# Patient Record
Sex: MALE | Race: WHITE | NOT HISPANIC OR LATINO | Employment: OTHER | URBAN - METROPOLITAN AREA
[De-identification: names, ages, dates, MRNs, and addresses within clinical notes are randomized per-mention and may not be internally consistent; named-entity substitution may affect disease eponyms.]

---

## 2018-10-09 ENCOUNTER — HOSPITAL ENCOUNTER (EMERGENCY)
Facility: HOSPITAL | Age: 48
Discharge: HOME | End: 2018-10-09
Attending: EMERGENCY MEDICINE
Payer: COMMERCIAL

## 2018-10-09 VITALS
BODY MASS INDEX: 24.69 KG/M2 | TEMPERATURE: 99 F | RESPIRATION RATE: 18 BRPM | WEIGHT: 153 LBS | DIASTOLIC BLOOD PRESSURE: 100 MMHG | HEART RATE: 72 BPM | OXYGEN SATURATION: 99 % | SYSTOLIC BLOOD PRESSURE: 181 MMHG

## 2018-10-09 DIAGNOSIS — L08.9 SOFT TISSUE INFECTION: Primary | ICD-10-CM

## 2018-10-09 DIAGNOSIS — Z51.89 VISIT FOR WOUND CHECK: ICD-10-CM

## 2018-10-09 PROCEDURE — 25000003 PHARM REV CODE 250: Performed by: PHYSICIAN ASSISTANT

## 2018-10-09 PROCEDURE — 99283 EMERGENCY DEPT VISIT LOW MDM: CPT

## 2018-10-09 PROCEDURE — 99283 EMERGENCY DEPT VISIT LOW MDM: CPT | Mod: ,,, | Performed by: PHYSICIAN ASSISTANT

## 2018-10-09 RX ORDER — DOXYCYCLINE 100 MG/1
100 CAPSULE ORAL 2 TIMES DAILY
Qty: 19 CAPSULE | Refills: 0 | Status: SHIPPED | OUTPATIENT
Start: 2018-10-09 | End: 2018-10-19

## 2018-10-09 RX ORDER — SULFAMETHOXAZOLE AND TRIMETHOPRIM 800; 160 MG/1; MG/1
1 TABLET ORAL
COMMUNITY
End: 2020-10-14

## 2018-10-09 RX ORDER — DOXYCYCLINE HYCLATE 100 MG
100 TABLET ORAL
Status: COMPLETED | OUTPATIENT
Start: 2018-10-09 | End: 2018-10-09

## 2018-10-09 RX ADMIN — DOXYCYCLINE HYCLATE 100 MG: 100 TABLET, COATED ORAL at 04:10

## 2018-10-09 NOTE — ED NOTES
Discharge home, states understanding to follow up as directed. Ambulates out of ED without difficulty. RX given, Med prior to discharge,

## 2018-10-09 NOTE — ED NOTES
Patient identifiers verified and correct for Mr Sales  C/C: redness to outer LLE  APPEARANCE: awake and alert in NAD.  SKIN: warm, dry Area to LLE with small amount redness, no black area, small puncture to outer left leg, warmth noted to circled area  MUSCULOSKELETAL: Patient moving all extremities spontaneously, no obvious swelling or deformities noted. Ambulates independently.  RESPIRATORY: Denies shortness of breath.Respirations unlabored. Denies fever.   CARDIAC: Denies CP, 2+ distal pulses; no peripheral edema  ABDOMEN: S/ND/NT, Denies nausea  : voids spontaneously, denies difficulty  Neurologic: AAO x 4; follows commands equal strength in all extremities; denies numbness/tingling. Denies dizziness Denies weakness

## 2018-10-09 NOTE — DISCHARGE INSTRUCTIONS
Follow-up with your family doctor in 2-3 days for close wound check.  Start taking the doxycycline.  Stop using the Bactrim.  Return to the ER with concerning symptoms. Tylenol or Motrin for pain if needed.

## 2018-10-09 NOTE — ED TRIAGE NOTES
Patient states he was in br[place], pulled up crab trap, puncture wounds to outer LLE. Noted redness to LLE, dots placed per spouse yesterday, went to Occupational  clinic, cleansed with Betadine, rx antibiotic Bactrim with 3 doses.

## 2018-10-09 NOTE — ED PROVIDER NOTES
Encounter Date: 10/9/2018       History     Chief Complaint   Patient presents with    Wound Infection     left leg was punctured by crab trap yesterday; left leg now swollen and painful     Patient is a 48-year-old male with history of CAD, atrial fibrillation is presenting to the ER for evaluation of wound infection.  Patient states that 2 days ago he was scratched by a crab trap.  He states that he was seen at the Occupational office and he was updated on his tetanus.  He was also placed on Bactrim.  He is concerned that the infection is worsening.  He denies any fevers or chills at home.  Patient denies exquisite pain but states that is slightly uncomfortable.  He has not taken any pain medication.  Denies paresthesia or focal weakness to his extremities.      The history is provided by the patient.     Review of patient's allergies indicates:  No Known Allergies  Past Medical History:   Diagnosis Date    Atrial fibrillation     Coronary artery disease     Fracture, clavicle 2003    HSV-1 infection     Palpitations      Past Surgical History:   Procedure Laterality Date    TONSILLECTOMY, ADENOIDECTOMY      26yo     Family History   Problem Relation Age of Onset    Alzheimer's disease Mother     Coronary artery disease Father         late 50's; very lifestyle oriented     Social History     Tobacco Use    Smoking status: Never Smoker    Smokeless tobacco: Never Used   Substance Use Topics    Alcohol use: Yes     Alcohol/week: 0.0 oz     Comment: once weekly maybe    Drug use: No     Review of Systems   Constitutional: Negative for chills and fever.   HENT: Negative for congestion.    Respiratory: Negative for cough and shortness of breath.    Cardiovascular: Negative for chest pain and palpitations.   Gastrointestinal: Negative for nausea and vomiting.   Musculoskeletal: Negative for joint swelling.   Skin: Positive for wound.   Allergic/Immunologic: Negative for immunocompromised state.    Neurological: Negative for dizziness and weakness.   Hematological: Does not bruise/bleed easily.       Physical Exam     Initial Vitals [10/09/18 1544]   BP Pulse Resp Temp SpO2   (!) 181/100 72 18 98.5 °F (36.9 °C) 99 %      MAP       --         Physical Exam    Constitutional: He appears well-developed and well-nourished. He is not diaphoretic. No distress.   HENT:   Head: Normocephalic and atraumatic.   Eyes: Conjunctivae and EOM are normal.   Cardiovascular: Normal rate, regular rhythm, normal heart sounds and intact distal pulses.   Pulmonary/Chest: Breath sounds normal.   Neurological: He is alert and oriented to person, place, and time.   Skin: Skin is warm and dry.   1 cm abrasion to left lateral lower leg. There is minimal surrounding erythema. Not exquisitely tender.  No crepitus noted. No lymphangitis         ED Course   Procedures  Labs Reviewed - No data to display       Imaging Results    None                APC / Resident Notes:   Patient seen in the ER promptly upon arrival.  He is afebrile, no acute distress.  Physical examination reveals a 1 cm abrasion to the left lower extremity.  There is surrounding erythema and minimal tenderness on palpation. No lymphangitis noted. No crepitus noted. No evidence of necrosis. No concern for necrotizing fasciitis      Patient was given a dose of doxycycline ED.  Will switch antibiotics from Bactrim to doxycycline as there is concern for vibrio.  Felt that this is a mild infection at this time.  He was advised to have close follow up with family doctor within 2 days.  He was advised to take Tylenol or Motrin for pain if needed.  He was given strict return  precautions if his symptoms worsen.  He agreed to this treatment plan.  He is stable for discharge at this time.    The care of this patient was overseen by attending physician who agrees with treatment, plan, and disposition.           Attending Attestation:     Physician Attestation Statement for NP/PA:    I discussed this assessment and plan of this patient with the NP/PA, but I did not personally examine the patient. The face to face encounter was performed by the NP/PA.                     Clinical Impression:   The primary encounter diagnosis was Soft tissue infection. A diagnosis of Visit for wound check was also pertinent to this visit.      Disposition:   Disposition: Discharged  Condition: Stable                        Cindy Gordon PA-C  10/09/18 1639       Cindy Gordon PA-C  10/09/18 1739       James Murray MD  10/13/18 0911

## 2020-10-14 ENCOUNTER — OFFICE VISIT (OUTPATIENT)
Dept: FAMILY MEDICINE | Facility: CLINIC | Age: 50
End: 2020-10-14
Payer: COMMERCIAL

## 2020-10-14 VITALS
WEIGHT: 158.75 LBS | HEART RATE: 71 BPM | OXYGEN SATURATION: 98 % | BODY MASS INDEX: 25.51 KG/M2 | TEMPERATURE: 97 F | DIASTOLIC BLOOD PRESSURE: 82 MMHG | SYSTOLIC BLOOD PRESSURE: 121 MMHG | HEIGHT: 66 IN

## 2020-10-14 DIAGNOSIS — H65.111 NON-RECURRENT ACUTE ALLERGIC OTITIS MEDIA OF RIGHT EAR: ICD-10-CM

## 2020-10-14 DIAGNOSIS — Z12.5 SCREENING FOR MALIGNANT NEOPLASM OF PROSTATE: ICD-10-CM

## 2020-10-14 DIAGNOSIS — Z12.12 SCREENING FOR COLORECTAL CANCER: ICD-10-CM

## 2020-10-14 DIAGNOSIS — I10 ESSENTIAL HYPERTENSION: Primary | ICD-10-CM

## 2020-10-14 DIAGNOSIS — Z12.11 SCREENING FOR COLORECTAL CANCER: ICD-10-CM

## 2020-10-14 PROCEDURE — 99204 PR OFFICE/OUTPT VISIT, NEW, LEVL IV, 45-59 MIN: ICD-10-PCS | Mod: S$GLB,,, | Performed by: INTERNAL MEDICINE

## 2020-10-14 PROCEDURE — 99999 PR PBB SHADOW E&M-EST. PATIENT-LVL III: CPT | Mod: PBBFAC,,, | Performed by: INTERNAL MEDICINE

## 2020-10-14 PROCEDURE — 99204 OFFICE O/P NEW MOD 45 MIN: CPT | Mod: S$GLB,,, | Performed by: INTERNAL MEDICINE

## 2020-10-14 PROCEDURE — 99999 PR PBB SHADOW E&M-EST. PATIENT-LVL III: ICD-10-PCS | Mod: PBBFAC,,, | Performed by: INTERNAL MEDICINE

## 2020-10-14 RX ORDER — AMOXICILLIN AND CLAVULANATE POTASSIUM 875; 125 MG/1; MG/1
1 TABLET, FILM COATED ORAL EVERY 12 HOURS
Qty: 14 TABLET | Refills: 0 | Status: SHIPPED | OUTPATIENT
Start: 2020-10-14 | End: 2020-10-14

## 2020-10-14 RX ORDER — AMOXICILLIN AND CLAVULANATE POTASSIUM 875; 125 MG/1; MG/1
1 TABLET, FILM COATED ORAL EVERY 12 HOURS
Qty: 20 TABLET | Refills: 0 | Status: SHIPPED | OUTPATIENT
Start: 2020-10-14 | End: 2020-10-24

## 2020-10-14 RX ORDER — METHYLPREDNISOLONE 4 MG/1
TABLET ORAL
Qty: 1 PACKAGE | Refills: 0 | Status: SHIPPED | OUTPATIENT
Start: 2020-10-14 | End: 2020-11-04

## 2020-10-14 NOTE — ASSESSMENT & PLAN NOTE
augmnetin x7d with OTC antihistamine (Zyrtec, Claritin, Allegra, or Xyxal) and a steroid nasal spray such as flonase

## 2020-10-14 NOTE — PROGRESS NOTES
[facility] Note    Chief Complaint      Chief Complaint   Patient presents with    Otalgia     pt is having right ear pain, states that he just feels drained due to it.     History of Present Illness      Tristan Sales is a 50 y.o. male who presents today for chief complaint ear pain x2 weeks . Patient previously seen by me.    PCP: Delia Monet MD  Patient comes to appointment alone.     HPI   [place], has h/o seasonal allergies, started having allergy symptoms, using nsaids prn, feeling tired and run down,  Sore throat on r side and r ear pressure, mild headache, no cough sneeze congestion, no fever or runny nose, does not want flu shot   Active Problem List with Overview Notes    Diagnosis Date Noted    Acute allergic otitis media of right ear 10/14/2020    Atrial fibrillation 03/04/2015    HSV-1 infection     URI (upper respiratory infection) 05/16/2013     Health Maintenance   Topic Date Due    Hepatitis C Screening  1970    TETANUS VACCINE  03/24/1988    Lipid Panel  05/16/2018       Past Medical History:   Diagnosis Date    Atrial fibrillation     Coronary artery disease     Fracture, clavicle 2003    HSV-1 infection     Palpitations        Past Surgical History:   Procedure Laterality Date    TONSILLECTOMY, ADENOIDECTOMY      28yo       family history includes Alzheimer's disease in his mother; Coronary artery disease in his father.     Social History     Tobacco Use    Smoking status: Never Smoker    Smokeless tobacco: Never Used   Substance Use Topics    Alcohol use: Yes     Alcohol/week: 0.0 standard drinks     Frequency: Never     Drinks per session: Patient refused     Binge frequency: Never     Comment: once weekly maybe    Drug use: No       Review of Systems   Constitutional: Negative for chills, fever, malaise/fatigue and weight loss.   HENT: Positive for ear pain and sore throat. Negative for congestion,  "hearing loss and sinus pain.    Respiratory: Negative for cough, sputum production, shortness of breath and wheezing.    Gastrointestinal: Negative for abdominal pain, diarrhea, nausea and vomiting.        Outpatient Encounter Medications as of 10/14/2020   Medication Sig Note Dispense Refill    multivitamin with minerals tablet Take 1 tablet by mouth once daily.       omega-3 fatty acids 1,000 mg Cap Take by mouth. 2 Capsule Oral Every day.  some times takes twice daily       valacyclovir (VALTREX) 500 MG tablet take 1 tablet by mouth twice a day (Patient taking differently: take 1 tablet by mouth twice a day prn) 10/14/2020: As needed 60 tablet 2    amoxicillin-clavulanate 875-125mg (AUGMENTIN) 875-125 mg per tablet Take 1 tablet by mouth every 12 (twelve) hours. for 10 days  20 tablet 0    methylPREDNISolone (MEDROL DOSEPACK) 4 mg tablet use as directed  1 Package 0    [DISCONTINUED] amoxicillin-clavulanate 875-125mg (AUGMENTIN) 875-125 mg per tablet Take 1 tablet by mouth every 12 (twelve) hours.  14 tablet 0    [DISCONTINUED] sulfamethoxazole-trimethoprim 800-160mg (BACTRIM DS) 800-160 mg Tab Take 1 tablet by mouth every 12 (twelve) hours.        No facility-administered encounter medications on file as of 10/14/2020.        Review of patient's allergies indicates:  No Known Allergies      Physical Exam      Vital Signs  Temp: 97 °F (36.1 °C)  Temp src: Oral  Pulse: 71  SpO2: 98 %  BP: 121/82  Pain Score:   2  Pain Loc: Ear(right ear)  Height and Weight  Height: 5' 6" (167.6 cm)  Weight: 72 kg (158 lb 11.7 oz)  BSA (Calculated - sq m): 1.83 sq meters  BMI (Calculated): 25.6  Weight in (lb) to have BMI = 25: 154.6]    Physical Exam  Vitals signs reviewed.   Constitutional:       Appearance: Normal appearance.   HENT:      Head: Normocephalic and atraumatic.      Right Ear: Ear canal normal. Tympanic membrane is erythematous and retracted.      Left Ear: Ear canal normal. Tympanic membrane is erythematous " and bulging.      Nose: Nose normal.      Mouth/Throat:      Pharynx: Posterior oropharyngeal erythema present. No oropharyngeal exudate.      Tonsils: No tonsillar exudate.          Assessment/Plan     Tristan Sales is a 50 y.o.male with:    Acute allergic otitis media of right ear  augmnetin x7d with OTC antihistamine (Zyrtec, Claritin, Allegra, or Xyxal) and a steroid nasal spray such as flonase           Orders Placed This Encounter   Procedures    Cologuard Screening (Multitarget Stool DNA)     Standing Status:   Future     Number of Occurrences:   1     Standing Expiration Date:   12/13/2021    CBC auto differential     Standing Status:   Future     Standing Expiration Date:   12/13/2021    Lipid Panel     Standing Status:   Future     Standing Expiration Date:   12/13/2021    Comprehensive Metabolic Panel     Standing Status:   Future     Standing Expiration Date:   12/13/2021    PSA, Screening     Standing Status:   Future     Standing Expiration Date:   12/14/2021       Use of the [facility] Patient Portal discussed and encouraged during today's visit  -Continue current medications and maintain follow up with specialists.  Return to clinic in 3 months.  Future Appointments   Date Time Provider Department Center   11/4/2020  8:30 AM [facility], [facility]   1/14/2021 10:45 AM Del[facility] Monet MD [facility]  10/14/2020 11:23 AM    Primary Care Internal Medicine - [facility]

## 2020-11-04 LAB — GASTROCULT GAST QL: NEGATIVE

## 2020-11-12 ENCOUNTER — PATIENT OUTREACH (OUTPATIENT)
Dept: ADMINISTRATIVE | Facility: HOSPITAL | Age: 50
End: 2020-11-12

## 2020-11-12 ENCOUNTER — TELEPHONE (OUTPATIENT)
Dept: ADMINISTRATIVE | Facility: HOSPITAL | Age: 50
End: 2020-11-12

## 2020-11-25 ENCOUNTER — PATIENT MESSAGE (OUTPATIENT)
Dept: FAMILY MEDICINE | Facility: CLINIC | Age: 50
End: 2020-11-25

## 2021-04-09 ENCOUNTER — PATIENT MESSAGE (OUTPATIENT)
Dept: FAMILY MEDICINE | Facility: CLINIC | Age: 51
End: 2021-04-09

## 2021-04-10 RX ORDER — VALACYCLOVIR HYDROCHLORIDE 500 MG/1
TABLET, FILM COATED ORAL
Qty: 60 TABLET | Refills: 2 | Status: SHIPPED | OUTPATIENT
Start: 2021-04-10 | End: 2022-07-01

## 2021-04-15 ENCOUNTER — PATIENT MESSAGE (OUTPATIENT)
Dept: RESEARCH | Facility: HOSPITAL | Age: 51
End: 2021-04-15

## 2022-01-10 ENCOUNTER — PATIENT MESSAGE (OUTPATIENT)
Dept: ADMINISTRATIVE | Facility: HOSPITAL | Age: 52
End: 2022-01-10
Payer: COMMERCIAL

## 2022-07-08 ENCOUNTER — TELEPHONE (OUTPATIENT)
Dept: INTERNAL MEDICINE | Facility: CLINIC | Age: 52
End: 2022-07-08
Payer: COMMERCIAL

## 2022-07-08 DIAGNOSIS — Z00.00 WELLNESS EXAMINATION: ICD-10-CM

## 2022-07-08 DIAGNOSIS — Z11.59 ENCOUNTER FOR HEPATITIS C SCREENING TEST FOR LOW RISK PATIENT: Primary | ICD-10-CM

## 2022-07-08 NOTE — TELEPHONE ENCOUNTER
Pt reached out to me to schedule annual, please call   Labs prders placed he can do prior to appt  Thank you   Delia Monet MD  7/8/2022 9:28 AM

## 2022-12-05 ENCOUNTER — HOSPITAL ENCOUNTER (EMERGENCY)
Facility: HOSPITAL | Age: 52
Discharge: HOME | End: 2022-12-05
Attending: EMERGENCY MEDICINE
Payer: COMMERCIAL

## 2022-12-05 VITALS
RESPIRATION RATE: 18 BRPM | DIASTOLIC BLOOD PRESSURE: 101 MMHG | SYSTOLIC BLOOD PRESSURE: 138 MMHG | TEMPERATURE: 99 F | WEIGHT: 158.75 LBS | OXYGEN SATURATION: 98 % | HEART RATE: 65 BPM | BODY MASS INDEX: 25.62 KG/M2

## 2022-12-05 DIAGNOSIS — I10 HYPERTENSION: ICD-10-CM

## 2022-12-05 DIAGNOSIS — R03.0 ELEVATED BLOOD PRESSURE READING: Primary | ICD-10-CM

## 2022-12-05 PROCEDURE — 99285 EMERGENCY DEPT VISIT HI MDM: CPT | Mod: ,,, | Performed by: EMERGENCY MEDICINE

## 2022-12-05 PROCEDURE — 93010 ELECTROCARDIOGRAM REPORT: CPT | Mod: ,,, | Performed by: INTERNAL MEDICINE

## 2022-12-05 PROCEDURE — 99283 EMERGENCY DEPT VISIT LOW MDM: CPT

## 2022-12-05 PROCEDURE — 93010 EKG 12-LEAD: ICD-10-PCS | Mod: ,,, | Performed by: INTERNAL MEDICINE

## 2022-12-05 PROCEDURE — 99285 PR EMERGENCY DEPT VISIT,LEVEL V: ICD-10-PCS | Mod: ,,, | Performed by: EMERGENCY MEDICINE

## 2022-12-05 PROCEDURE — 93005 ELECTROCARDIOGRAM TRACING: CPT

## 2022-12-06 NOTE — FIRST PROVIDER EVALUATION
Medical screening examination initiated.  I have conducted a focused provider triage encounter, findings are as follows:    Brief history of present illness:    Headache, dizziness, elevated BP (140/102)    Headache 6/10, no hx of headaches, no n/v, no focal weakness, + episodes of feeling off balance, was seeing spots last night, no visual changes today    Not on antihypertensive medication    Vitals:    12/05/22 1811   BP: (!) 138/101   BP Location: Right arm   Patient Position: Sitting   Pulse: 65   Resp: 18   Temp: 98.7 °F (37.1 °C)   TempSrc: Oral   SpO2: 98%   Weight: 72 kg (158 lb 11.7 oz)       Pertinent physical exam:  comfortable, normal speech, symmetrical speech, normal gait    Brief workup plan:  labs, ekg, CT head    Preliminary workup initiated; this workup will be continued and followed by the physician or advanced practice provider that is assigned to the patient when roomed.

## 2022-12-06 NOTE — ED PROVIDER NOTES
Encounter Date: 12/5/2022    SCRIBE #1 NOTE: I, Melba Duong, am scribing for, and in the presence of,  Mika Brooks DO. I have scribed the following portions of the note - the EKG reading. Other sections scribed: HPI, ROS.     History     Chief Complaint   Patient presents with    Hypertension     Headache and dizziness. All began approximately 36 hours ago. Denies vomiting or nausea. Reports tingling in bilateral hands.      Tristan Sales is a 52 y.o. male with a prior medical history of A-fib presenting with hypertension for the past week. Associated symptoms include tinnitus and headaches that has since resolved. He does report that he recently began drinking coffee again for the first time in years. He stopped drinking coffee a few days ago after he noticed the hypertension. He also reports taking Mucinex about 4-5 days ago for a cold. In addition he reports he has been having difficulty sleeping. He has an appointment later in the week with his PCP.  He denies any actual dizziness at this time, visual complaints, lightheadedness, weakness, gait instability, falls or trauma.  He denies any visual changes or current headache.  He is asymptomatic at this time.  No other aggravating or alleviating factors.  He denies any nausea, vomiting or diarrhea.  He denies any chest pain, abdominal pain, back pain or neck pain.    The history is provided by the patient and medical records. No  was used.   Review of patient's allergies indicates:  No Known Allergies  Past Medical History:   Diagnosis Date    Atrial fibrillation     Coronary artery disease     Fracture, clavicle 2003    HSV-1 infection     Palpitations      Past Surgical History:   Procedure Laterality Date    TONSILLECTOMY, ADENOIDECTOMY      28yo     Family History   Problem Relation Age of Onset    Alzheimer's disease Mother     Coronary artery disease Father         late 50's; very lifestyle oriented     Social History      Tobacco Use    Smoking status: Never    Smokeless tobacco: Never   Substance Use Topics    Alcohol use: Yes     Alcohol/week: 0.0 standard drinks     Comment: once weekly maybe    Drug use: No     Review of Systems   Constitutional:  Negative for diaphoresis, fatigue and fever.   HENT:  Positive for tinnitus. Negative for congestion and sore throat.    Eyes:  Negative for photophobia, pain and visual disturbance.   Respiratory:  Negative for chest tightness, shortness of breath and wheezing.    Cardiovascular:  Negative for chest pain and palpitations.        +hypertension   Gastrointestinal:  Negative for abdominal pain, nausea and vomiting.   Genitourinary:  Negative for dysuria, frequency and hematuria.   Musculoskeletal:  Negative for neck pain and neck stiffness.   Skin:  Negative for color change and wound.   Neurological:  Positive for headaches. Negative for tremors and light-headedness.   Psychiatric/Behavioral:  Negative for confusion.      Physical Exam     Initial Vitals [12/05/22 1811]   BP Pulse Resp Temp SpO2   (!) 138/101 65 18 98.7 °F (37.1 °C) 98 %      MAP       --         Physical Exam    Nursing note and vitals reviewed.    Gen/Constitutional: Interactive. No acute distress  Head: Normocephalic, Atraumatic  Neck: supple, no masses or LAD, no JVD  Eyes: PERRLA, conjunctiva clear  Ears, Nose and Throat: No rhinorrhea or stridor.  Cardiac:  Regular rate, Reg Rhythm, No murmur  Pulmonary: CTA Bilat, no wheezes, rhonchi, rales.  No increased work of breathing.  GI: Abdomen soft, non-tender, non-distended; no rebound or guarding  : No CVA tenderness.  Musculoskeletal: Extremities warm, well perfused, no erythema, no edema  Skin: No rashes, cyanosis or jaundice.  Psych: Normal affect   Detailed Neurologic exam:  Mental Status:  Normal attention and reasoning. There is no evidence of a thought disorder. Affect and mood were unremarkable. Speech was normal and prosody was intact. Verbal expression  and comprehension are intact. Immediate recall, recent and remote memory were intact.  Neck:  Supple. No cervical bruits.  Cranial Nerves:  Visual fields were normal to confrontation and visual acuity was at baseline. Funduscopy was limited by pupillary light response. Pupils were of equal (4mm to 3mm bilaterally) and exhibited a normal direct and consensual response to light. There was no APD. Ocular motility was full and there was no nystagmus evident. Strength of masticatory muscles was normal. Facial sensation was normal. Corneal reflexes were present. No facial weakness. Hearing acuity was normal. Palatal movements and gag reflex were intact. Sternocleidomastoid and trapezii strength were normal. Tongue protruded in the midline and showed no atrophy, tremor or fasciculations.  Motor Examination (bulk, tone, strength):  Motor examination showed normal muscle bulk, tone, and strength throughout. Rapid alternating movements were normal. There were no adventitious movements noted.  Muscle Stretch reflexes (MSR's):  Muscle stretch reflexes were symmetric and normoactive. Plantar responses were flexor bilaterally. No pathologic reflexes were appreciated.  Sensory:  Normal light-touch and position sense.  Cerebellar and coordination:  Coordination examination showed normal rapid alternating movements. Finger-finger and finger-nose testing were unremarkable.  Romberg test:  Romberg was negative.  Gait and Station:  Erect posture was normal. There was no postural instability.   Spine:  Normal range of motion. No tenderness on palpation. SLR negative.      ED Course   Procedures  Labs Reviewed - No data to display    EKG Readings: (Independently Interpreted)   Initial Reading: No STEMI. Rhythm: Sinus Bradycardia. Heart Rate: 58. Axis: Normal.   Normal intervals     Imaging Results    None          Medications - No data to display  Medical Decision Making:   History:   Old Medical Records: I decided to obtain old medical  records.  Initial Assessment:   Tristan Sales is a 52 y.o. male with a prior medical history of A-fib presenting with hypertension for the past week.  Differential Diagnosis:   Elevated blood pressure reading, medication reaction, fatigue, ACS, hypertensive emergency, intracranial process  Independently Interpreted Test(s):   I have ordered and independently interpreted EKG Reading(s) - see prior notes  Clinical Tests:   Medical Tests: Ordered and Reviewed     The patient is afebrile with slightly hypertensive without tachypnea, hypoxemia or tachycardia.  Physical exam findings with no focal neurologic deficits, lung sounds clear, cardiac exam unremarkable.  There is no abdominal peritoneal findings.  Patient is ambulatory without dizziness, lightheadedness or any gait instability.  His symptoms have completely resolved.  He was initially seen by triage and reported having episodes of tinnitus and dizziness.  CT scan, basic labs and ECG were ordered from triage.  I reviewed patient's findings including long and lengthy discussion at bedside.  He does have significant stressors of late but denies any other high-risk symptoms.  I offered him the CT scan, labs and ECG which were ordered by triage.  Patient declined CT scan and lab work at this time given that he is asymptomatic.  I was able to obtain an ECG with no signs of ischemia or STEMI on my read.  He does not have any symptoms of deficits on my exam or any red flag warning signs.  I instructed him to keep a blood pressure journal log and present to his PCP this week for re-evaluation where they may discuss initiation of blood pressure medicine if needed.  I also educated him on low-sodium diet, relaxation, stress release along with strict ED precautions and return instructions. Patient agreeable to discharge plan. Strict ED precautions and return instructions discussed at length and patient verbalized understanding. All questions were answered and ample  time was given for questions.      Complexity:  High-risk     Scribe Attestation:   Scribe #1: I performed the above scribed service and the documentation accurately describes the services I performed. I attest to the accuracy of the note.            I, Dr. Mika Brooks, personally performed the services described in this documentation. All medical record entries made by the scribe were at my direction and in my presence.  I have reviewed the chart and agree that the record reflects my personal performance and is accurate and complete.          Clinical Impression:   Final diagnoses:  [I10] Hypertension  [R03.0] Elevated blood pressure reading (Primary)        ED Disposition Condition    Discharge Stable          ED Prescriptions    None       Follow-up Information       Follow up With Specialties Details Why Contact Info    Delia Monet MD Internal Medicine Schedule an appointment as soon as possible for a visit in 1 week  [id number] [place] [address]  [phone]      [facility] - Emergency Dept Emergency Medicine Go to  As needed, If symptoms worsen [facility] [facility] [address] [phone]        Mika Brooks DO, FAAEM  Emergency Staff Physician   Dept of Emergency Medicine   [facility]  Spectralink: [id number]        Disclaimer: This note has been generated using voice-recognition software. There may be typographical errors that have been missed during proof-reading.      Mika Brooks DO  12/06/22 6642

## 2022-12-06 NOTE — DISCHARGE INSTRUCTIONS
Today, your evaluation did not show any abnormalities on your physical exam.  Please read the handout given to you on high blood pressure and low-salt diet.  Follow-up with your primary care this week for re-evaluation.  If he develops any stroke-like symptoms including weakness, difficulty ambulating, speech difficulty or any concerns including chest pain follow-up sooner or return emergency department.  Please keep a blood pressure journal log as we discussed.    Our goal in the emergency department is to always give you outstanding care and exceptional service. You may receive a survey by mail or e-mail in the next week regarding your experience in our ED. We would greatly appreciate your completing and returning the survey. Your feedback provides us with a way to recognize our staff who give very good care and it helps us learn how to improve when your experience was below our aspiration of excellence.

## 2023-06-22 ENCOUNTER — OFFICE VISIT (OUTPATIENT)
Dept: URGENT CARE | Facility: CLINIC | Age: 53
End: 2023-06-22
Payer: COMMERCIAL

## 2023-06-22 VITALS
SYSTOLIC BLOOD PRESSURE: 114 MMHG | RESPIRATION RATE: 18 BRPM | HEART RATE: 69 BPM | DIASTOLIC BLOOD PRESSURE: 75 MMHG | HEIGHT: 66 IN | OXYGEN SATURATION: 98 % | TEMPERATURE: 98 F | WEIGHT: 158 LBS | BODY MASS INDEX: 25.39 KG/M2

## 2023-06-22 DIAGNOSIS — J32.9 BACTERIAL SINUSITIS: Primary | ICD-10-CM

## 2023-06-22 DIAGNOSIS — B96.89 BACTERIAL SINUSITIS: Primary | ICD-10-CM

## 2023-06-22 PROCEDURE — 99203 OFFICE O/P NEW LOW 30 MIN: CPT | Mod: S$GLB,,, | Performed by: NURSE PRACTITIONER

## 2023-06-22 PROCEDURE — 99203 PR OFFICE/OUTPT VISIT, NEW, LEVL III, 30-44 MIN: ICD-10-PCS | Mod: S$GLB,,, | Performed by: NURSE PRACTITIONER

## 2023-06-22 RX ORDER — AZITHROMYCIN 250 MG/1
TABLET, FILM COATED ORAL
Qty: 6 TABLET | Refills: 0 | Status: SHIPPED | OUTPATIENT
Start: 2023-06-22 | End: 2023-06-27

## 2023-06-22 NOTE — PROGRESS NOTES
"Subjective:      Patient ID: Tristan Sales is a 53 y.o. male.    Vitals:  height is 5' 6" (1.676 m) and weight is 71.7 kg (158 lb). His oral temperature is 98.2 °F (36.8 °C). His blood pressure is 114/75 and his pulse is 69. His respiration is 18 and oxygen saturation is 98%.     Chief Complaint: URI    This is a 53 y.o. male who presents today with a chief complaint of sinus pressure and pain; frontal headache; swollen cervical lymph nodes; and sneezing x3 days. Denies fever. Denies chest pain, sob, wheeze, n/v/d.     URI   This is a new problem. Associated symptoms include congestion, headaches, sinus pain, sneezing and swollen glands. Pertinent negatives include no abdominal pain, chest pain, coughing, ear pain, nausea, neck pain, sore throat, vomiting or wheezing.     Constitution: Positive for fatigue. Negative for fever.   HENT:  Positive for congestion, postnasal drip, sinus pain and sinus pressure. Negative for ear pain and sore throat.    Neck: Negative for neck pain.   Cardiovascular:  Negative for chest pain.   Respiratory:  Negative for chest tightness, cough, shortness of breath and wheezing.    Gastrointestinal:  Negative for abdominal pain, nausea and vomiting.   Allergic/Immunologic: Positive for sneezing.   Neurological:  Positive for headaches.    Objective:     Physical Exam   Constitutional: He is oriented to person, place, and time.  Non-toxic appearance. He does not appear ill. No distress.   HENT:   Head: Normocephalic.   Ears:   Right Ear: Tympanic membrane, external ear and ear canal normal.   Left Ear: Tympanic membrane, external ear and ear canal normal.   Nose: Mucosal edema and congestion present. Right sinus exhibits maxillary sinus tenderness and frontal sinus tenderness. Left sinus exhibits maxillary sinus tenderness and frontal sinus tenderness.   Mouth/Throat: Mucous membranes are moist. Oropharynx is clear.   Eyes: Right eye exhibits no discharge. Left eye exhibits no " discharge.   Neck: Neck supple. No neck rigidity present.   Cardiovascular: Normal rate and regular rhythm.   Pulmonary/Chest: Effort normal and breath sounds normal. He has no wheezes. He has no rhonchi.   Abdominal: Normal appearance.   Musculoskeletal: Normal range of motion.         General: Normal range of motion.      Cervical back: He exhibits tenderness.   Lymphadenopathy:     He has cervical adenopathy.   Neurological: He is alert and oriented to person, place, and time.   Skin: Skin is warm, dry and not diaphoretic.   Psychiatric: His behavior is normal. Mood normal.   Nursing note and vitals reviewed.    Assessment:     1. Bacterial sinusitis        Plan:       Bacterial sinusitis  -     azithromycin (Z-ABA) 250 MG tablet; Take 2 tablets by mouth on day 1; Take 1 tablet by mouth on days 2-5  Dispense: 6 tablet; Refill: 0      Patient Instructions   Oral fluids  Rest  Steam (hot showers, hot tea)  Blow nose often  Avoid circulating air (such as ceiling fans) dries your airway  Therapeutic coughing to expel mucous  Sit in upright position often

## 2023-06-22 NOTE — PATIENT INSTRUCTIONS
Oral fluids  Rest  Steam (hot showers, hot tea)  Blow nose often  Avoid circulating air (such as ceiling fans) dries your airway  Therapeutic coughing to expel mucous  Sit in upright position often

## 2024-01-31 NOTE — TELEPHONE ENCOUNTER
Care Due:                  Date            Visit Type   Department     Provider  --------------------------------------------------------------------------------    Last Visit: None Found      None         None Found  Next Visit: None Scheduled  None         None Found                                                            Last  Test          Frequency    Reason                     Performed    Due Date  --------------------------------------------------------------------------------    Office Visit  15 months..  valACYclovir.............  Not Found    Overdue    CBC.........  12 months..  valACYclovir.............  Not Found    Overdue    Cr..........  12 months..  valACYclovir.............  Not Found    Overdue    [employer] Embedded Care Due Messages. Reference number: [id number].   1/31/2024 11:21:35 AM CST

## 2024-02-01 RX ORDER — VALACYCLOVIR HYDROCHLORIDE 500 MG/1
TABLET, FILM COATED ORAL
Qty: 60 TABLET | Refills: 2 | Status: SHIPPED | OUTPATIENT
Start: 2024-02-01